# Patient Record
Sex: FEMALE | Race: WHITE
[De-identification: names, ages, dates, MRNs, and addresses within clinical notes are randomized per-mention and may not be internally consistent; named-entity substitution may affect disease eponyms.]

---

## 2018-02-03 ENCOUNTER — HOSPITAL ENCOUNTER (EMERGENCY)
Dept: HOSPITAL 33 - ED | Age: 33
Discharge: HOME | End: 2018-02-03
Payer: COMMERCIAL

## 2018-02-03 VITALS — HEART RATE: 82 BPM | DIASTOLIC BLOOD PRESSURE: 88 MMHG | SYSTOLIC BLOOD PRESSURE: 132 MMHG

## 2018-02-03 VITALS — OXYGEN SATURATION: 96 %

## 2018-02-03 DIAGNOSIS — R09.1: Primary | ICD-10-CM

## 2018-02-03 LAB
ANION GAP SERPL CALC-SCNC: 14.4 MEQ/L (ref 5–15)
BASOPHILS # BLD AUTO: 0.07 10*3/UL (ref 0–0.4)
BASOPHILS NFR BLD AUTO: 0.7 % (ref 0–0.4)
BUN SERPL-MCNC: 19 MG/DL (ref 9–20)
CALCIUM SPEC-MCNC: 8.9 MG/DL (ref 8.5–10.1)
CHLORIDE SERPL-SCNC: 102 MEQ/L (ref 98–107)
CO2 SERPL-SCNC: 26.9 MEQ/L (ref 21–32)
CREAT SERPL-MCNC: 0.85 MG/DL (ref 0.55–1.3)
EOSINOPHIL # BLD AUTO: 0.18 10*3/UL (ref 0–0.5)
GFR SERPLBLD BASED ON 1.73 SQ M-ARVRAT: > 60 ML/MIN
GLUCOSE SERPL-MCNC: 129 MG/DL (ref 70–110)
GRANULOCYTES # BLD AUTO: 6.43 10*3/UL (ref 1.4–6.9)
HCT VFR BLD AUTO: 42 % (ref 35–47)
HGB BLD-MCNC: 13.7 GM/DL (ref 12–16)
LYMPHOCYTES # SPEC AUTO: 3.48 10*3/UL (ref 1–4.6)
MCH RBC QN AUTO: 27.1 PG (ref 26–32)
MCHC RBC AUTO-ENTMCNC: 32.6 G/DL (ref 32–36)
MONOCYTES # BLD AUTO: 0.59 10*3/UL (ref 0–1.3)
NEUTROPHILS NFR BLD AUTO: 59.7 % (ref 36–66)
PLATELET # BLD AUTO: 239 K/MM3 (ref 150–450)
POTASSIUM SERPLBLD-SCNC: 3.7 MEQ/L (ref 3.5–5.1)
RBC # BLD AUTO: 5.06 M/MM3 (ref 4.1–5.4)
SODIUM SERPL-SCNC: 140 MEQ/L (ref 136–145)
WBC # BLD AUTO: 10.8 K/MM3 (ref 4–10.5)

## 2018-02-03 PROCEDURE — 99284 EMERGENCY DEPT VISIT MOD MDM: CPT

## 2018-02-03 PROCEDURE — 85379 FIBRIN DEGRADATION QUANT: CPT

## 2018-02-03 PROCEDURE — 36000 PLACE NEEDLE IN VEIN: CPT

## 2018-02-03 PROCEDURE — 71046 X-RAY EXAM CHEST 2 VIEWS: CPT

## 2018-02-03 PROCEDURE — 36415 COLL VENOUS BLD VENIPUNCTURE: CPT

## 2018-02-03 PROCEDURE — 85025 COMPLETE CBC W/AUTO DIFF WBC: CPT

## 2018-02-03 PROCEDURE — 80048 BASIC METABOLIC PNL TOTAL CA: CPT

## 2018-02-03 NOTE — ERPHSYRPT
- History of Present Illness


Time Seen by Provider: 02/03/18 20:30


Historian: patient


Exam Limitations: clinical condition


Patient Subjective Stated Complaint: Pt states "I have been fighting bronchitis 

since christmas and I have had this chest pain for the past week. My two 

sisters are nurses and they told me I need to go to the ER.  I have an 

appointment with Dr. Melvin on wednesday.  Right now my chest does not hurt that 

bad."


Triage Nursing Assessment: Pt alert and oriented X 3, skin pwd. Pt ambulates 

without difficutly, able to speak in clear full sentences. No apparent distress.


Physician History: 





PATIENT COMPLAINS OF INTERMITTENT EPISODES OF BRONCHITIS X 6 WEEKS, TREATED 

WITH A 10 DAYS COURSE OF AMOXICILLIN, COURSE OF STEROIDS,  NOW HAS INTERMITTENT 

LEFT UPPER CHEST PAIN WORSE UPON COUGHING.  DENIES FEVER OR DYSPNEA.


Timing/Duration: week(s) (X 6)


Activities at Onset: activity


Quality: pressure, sharpness


Location: other (LEFT UPP)


Severity of Pain-Max: mild


Severity of Pain-Current: mild


Modifying Factors: Improves With: coughing


Associated Symptoms: denies symptoms


Prior Chest Pain/Cardiac Workup: no prior cardiac workup


Nitro Today/Relief: no nitro taken today


Aspirin Treatment Today: no aspirin today


Allergies/Adverse Reactions: 








No Known Drug Allergies Allergy (Unverified 02/03/18 20:22)


 





Home Medications: 








Lisinopril 10 mg*** [Zestril 10 MG***] 10 mg PO DAILY 02/03/18 [History]





Hx Tetanus, Diphtheria Vaccination/Date Given: No


Hx Influenza Vaccination/Date Given: No


Hx Pneumococcal Vaccination/Date Given: No


Immunizations Up to Date: Yes





- Past Medical History


Neurological History: No Pertinent History


Cardiac History: No Pertinent History


Respiratory History: No Pertinent History


Endocrine Medical History: No Pertinent History


Musculoskeletal History: No Pertinent History


Other Medical History: hypertension





- Past Surgical History


Past Surgical History: Yes


Other Surgical History: ablasion.  c section X 2





- Social History


Smoking Status: Never smoker


Exposure to second hand smoke: No


Drug Use: none


Patient Lives Alone: No





- Female History


Hx Last Menstrual Period: 01/22/2018


Hx Pregnant Now: No





- Nursing Vital Signs


Nursing Vital Signs: 


 Initial Vital Signs











Temperature  99.2 F   02/03/18 20:14


 


Pulse Rate  92 H  02/03/18 20:14


 


Respiratory Rate  18   02/03/18 20:14


 


Blood Pressure  144/92   02/03/18 20:14


 


O2 Sat by Pulse Oximetry  96   02/03/18 20:14








 Pain Scale











Pain Intensity                 6

















- Physical Exam


SpO2: 96


Oxygen Delivery: Room Air





- Course


EKG Interpreted by Me: RATE, Sinus Rhythm, NORMAL AXIS





- Radiology Exams


  ** Chest


X-ray Interpretation: Interpreted by me, Negative


Ordered Tests: 


 Active Orders 24 hr











 Category Date Time Status


 


 IV Insertion STAT Care  02/03/18 21:20 Active


 


 CHEST 2 VIEWS (PA AND LAT) Stat Exams  02/03/18 20:50 Completed


 


 BMP Stat Lab  02/03/18 21:07 Completed


 


 CBC W DIFF Stat Lab  02/03/18 21:07 Completed


 


 D-DIMER QUANTITATION Stat Lab  02/03/18 21:07 Completed


 


 HCG,QUALITATIVE URINE Stat Lab  02/03/18 20:50 Ordered








Medication Summary











Generic Name Dose Route Start Last Admin





  Trade Name Freq  PRN Reason Stop Dose Admin


 


Sodium Chloride  1,000 mls @ 100 mls/hr  02/03/18 21:00  02/03/18 21:17





  Sodium Chloride 0.9% 1000 Ml  IV  03/05/18 20:59  100 mls/hr





  .Q10H ALLEY   Administration














Discontinued Medications














Generic Name Dose Route Start Last Admin





  Trade Name Freq  PRN Reason Stop Dose Admin


 


Ketorolac Tromethamine  30 mg  02/03/18 22:37  02/03/18 22:44





  Toradol 30 Mg Injection***  IV  02/03/18 22:38  30 mg





  STAT ONE   Administration


 


Ketorolac Tromethamine  Confirm  02/03/18 22:42  





  Toradol 30 Mg Injection***  Administered  02/03/18 22:43  





  Dose   





  30 mg   





  .ROUTE   





  .[facility] ONE   











Lab/Rad Data: 


 Laboratory Result Diagrams





 02/03/18 21:07 





 02/03/18 21:07 





 Laboratory Results











  02/03/18 02/03/18 02/03/18 Range/Units





  21:07 21:07 21:07 


 


WBC    10.8 H  (4.0-10.5)  K/mm3


 


RBC    5.06  (4.1-5.4)  M/mm3


 


Hgb    13.7  (12.0-16.0)  gm/dl


 


Hct    42.0  (35-47)  %


 


MCV    83.0  ()  fl


 


MCH    27.1  (26-32)  pg


 


MCHC    32.6  (32-36)  g/dl


 


RDW    13.3  (11.5-14.0)  %


 


Plt Count    239  (150-450)  K/mm3


 


MPV    10.1 H  (6-9.5)  fl


 


Gran %    59.7  (36.0-66.0)  %


 


Lymphocytes %    32.4  (24.0-44.0)  %


 


Monocytes %    5.5  (0.0-12.0)  %


 


Eosinophils %    1.7  (0.00-5.0)  %


 


Basophils %    0.7  (0.0-0.4)  %


 


Basophils #    0.07  (0-0.4)  


 


D-Dimer  451.25    (0-500)  ng/mL


 


Sodium   140   (136-145)  mEq/L


 


Potassium   3.7   (3.5-5.1)  mEq/L


 


Chloride   102   ()  mEq/L


 


Carbon Dioxide   26.9   (21-32)  mEq/L


 


Anion Gap   14.4   (5-15)  MEQ/L


 


BUN   19   (9-20)  mg/dL


 


Creatinine   0.85   (0.55-1.30)  mg/dl


 


Estimated GFR   > 60   ML/MIN


 


Glucose   129 H   ()  MG/DL


 


Calcium   8.9   (8.5-10.1)  mg/dL














- Progress


Progress Note: 





02/03/18 22:55


ADMINISTERED TORADOL 30MG IV





Counseled pt/family regarding: lab results, diagnosis, need for follow-up





- Departure


Time of Disposition: 23:00


Departure Disposition: Home


Clinical Impression: 


 PLEURISY





Condition: Stable


Critical Care Time: No


Referrals: 


SURESH MELVIN MD [Primary Care Provider] - 


Additional Instructions: 


TORADOL 10MG EVERY 6 HOURS AS NEEDED FOR PAIN.  CONSULT YOUR PRIMARY CARE 

PROVIDER FOR FOLLOWUP AS SCHEDULED. RETURN TO EMERGENCY FOR INCREASING 

DISCOMFORT


Prescriptions: 


Ketorolac Tromethamine [Toradol] 10 mg PO Q6HPRN PRN #20 tablet


 PRN Reason: Pain